# Patient Record
Sex: FEMALE | Race: WHITE | NOT HISPANIC OR LATINO
[De-identification: names, ages, dates, MRNs, and addresses within clinical notes are randomized per-mention and may not be internally consistent; named-entity substitution may affect disease eponyms.]

---

## 2017-11-24 ENCOUNTER — RESULT REVIEW (OUTPATIENT)
Age: 77
End: 2017-11-24

## 2019-06-18 ENCOUNTER — APPOINTMENT (OUTPATIENT)
Dept: ORTHOPEDIC SURGERY | Facility: CLINIC | Age: 79
End: 2019-06-18
Payer: MEDICARE

## 2019-06-18 VITALS
HEIGHT: 58 IN | HEART RATE: 67 BPM | WEIGHT: 160 LBS | SYSTOLIC BLOOD PRESSURE: 149 MMHG | BODY MASS INDEX: 33.58 KG/M2 | DIASTOLIC BLOOD PRESSURE: 89 MMHG

## 2019-06-18 DIAGNOSIS — M25.572 PAIN IN LEFT ANKLE AND JOINTS OF LEFT FOOT: ICD-10-CM

## 2019-06-18 DIAGNOSIS — M25.571 PAIN IN RIGHT ANKLE AND JOINTS OF RIGHT FOOT: ICD-10-CM

## 2019-06-18 DIAGNOSIS — M25.572 PAIN IN RIGHT ANKLE AND JOINTS OF RIGHT FOOT: ICD-10-CM

## 2019-06-18 PROCEDURE — 73610 X-RAY EXAM OF ANKLE: CPT | Mod: RT

## 2019-06-18 PROCEDURE — 99204 OFFICE O/P NEW MOD 45 MIN: CPT

## 2019-06-18 PROCEDURE — 73564 X-RAY EXAM KNEE 4 OR MORE: CPT | Mod: LT

## 2019-06-18 NOTE — CONSULT LETTER
[FreeTextEntry1] : Dear Dr.Evan Pagan,\par \par I had the pleasure of evaluating your patient in the office today. I am sending you a copy of my Orthopaedic consult note for your record. If you have any questions, please do not hesitate to contact my office.\par \par Sincerely,\par \par Arias Otoole MD\par Chief, Adult Joint Reconstruction\par [facility]

## 2019-06-18 NOTE — HISTORY OF PRESENT ILLNESS
[de-identified] : Pt is an 77 y/o female c/o right ankle pain.  She states that she was vacuuming 2 weeks ago and she hit her right ankle.  She developed swelling and her "veins popped out". She also has some swelling of her left ankle.   Her PCP sent her for a Doppler which showed a Bakers Cyst in her right knee. The doppler was negative for DVT She notes intermittent pain in the right knee.  It is sharp and it happens at night.  She has some stiffness. She does have difficulty walking because of stiffness in her legs.  She does not take anything for pain.  She has multiple drug allergies and sensitivities.\par She notes that she had a course of Cipro in March and she developed pain and tightening in her joints.

## 2019-06-18 NOTE — DISCUSSION/SUMMARY
[de-identified] : At this time this patient has severe osteoarthritis in her knees.  She does not want a local injection she says she is managing she will continue to use her cane her ankles are feeling better.  In the future she may benefit from total knee replacements she does not want to take any oral medications until she speaks to Dr. Juan Pablo Pagan because they sometimes irritate her stomach she will talk to Dr. Pagan about the possibility of Celebrex 200 mg once a day.

## 2019-06-18 NOTE — PHYSICAL EXAM
[de-identified] : Constitutional - the patient is of normal build and nutrition.  The patient remains oriented to person, time, and  place.  Mood is normal. Vital signs as recorded are within normal limits.  The patients gait is pain in both knees on the underside radiating down her right legs toward her ankles.  She has had pain in her ankles but they are now improved.. The patient has satisfactory  balance and can to  easily walk on toes and heels.\par \par The patient has no difficulty with respiration. Respiration at rest is a normal rate. The patient is not short of breath and has not become short of breath with short ambulation. There is no audible wheezing. No coughing.\par \par Skin is normal for the patient's age. There are no abnormal masses or lymph nodes which stand out in the lower extremities.\par \par Spine - deep tendon reflexes are symmetric.  Appears by history she may have had backache in the past and some discomfort in her legs could be from her back.\par \par \par UPPER EXTREMITIES \par \par Shoulders ROM  is symmetric  and the motion is satisfactory.  There is no significant shoulder pain or limitation in motion which would make using a cane or a walker difficult. Shoulder stability and  strength are satisfactory.\par \par Circulation appears satisfactory with pedal pulses present.  There is no major edema in the lower legs. No skin tenderness or increased temperature. No major varicosities.\par \par HIP EXAMINATION the abduction and abduction as well as rotation measurements were taken with the hip in flexion.\par \par Motion\par Hip flexion                               *[Right 135   Left 135]\par Hip abduction                         *[Right 70      Left 70]\par Hip adduction                         *[Right 35     Left 35]\par Hip external  Rotation             *[Right 65     Left 65]\par Hip Internal Rotation              *[Right 20      Left 20]\par Hip Extension                         *[Right 0        Left 0]\par \par The hips have good range of motion. There is good strength across the hips. There is no crepitus in either hip. The alignment of the hips are normal.\par \par \par KNEE EXAMINATION\par \par Motion\par Please go from 5 degrees short of full extension to 115 degrees of flexion she has satisfactory medial lateral and anterior posterior stability.  There is no Baker's cyst in either knee she has patellofemoral crepitus bilaterally positive medial Steinmann test bilaterally no pain with palpation of her patella as well as her medial joint lines.\par The Knees have very good motion. There is good medial lateral and anterior posterior stability. There is no crepitus. There is no effusion. There is good strength across the knees.\par \par Ankle and foot examination\par Of the ankle has normal motion.  There is normal ankle stability.  The patient has no major abnormalities of the foot.  Some slight swelling around her ankles but this should just be some fluid retention at this time she is moving her ankles well.\par \par \par \par  [de-identified] : Views of her right and left knees show bilateral medial compartment bone-on-bone arthritis in the standing and Martin views bilateral patellofemoral arthritis femur large peripheral osteophytes on the skyline view.\par \par 3 views of her right and left ankles do show slight degenerative changes small osteophytes of the calcaneus at the insertion Achilles no fracture present generally her ankle joint space is maintained seen on the mortise views.

## 2019-07-08 ENCOUNTER — APPOINTMENT (OUTPATIENT)
Dept: ORTHOPEDIC SURGERY | Facility: CLINIC | Age: 79
End: 2019-07-08

## 2019-08-28 ENCOUNTER — NON-APPOINTMENT (OUTPATIENT)
Age: 79
End: 2019-08-28

## 2019-08-28 ENCOUNTER — APPOINTMENT (OUTPATIENT)
Dept: INTERNAL MEDICINE | Facility: CLINIC | Age: 79
End: 2019-08-28
Payer: MEDICARE

## 2019-08-28 VITALS — HEIGHT: 58 IN | WEIGHT: 168 LBS | BODY MASS INDEX: 35.26 KG/M2

## 2019-08-28 VITALS
SYSTOLIC BLOOD PRESSURE: 146 MMHG | TEMPERATURE: 98.7 F | OXYGEN SATURATION: 93 % | HEART RATE: 90 BPM | DIASTOLIC BLOOD PRESSURE: 79 MMHG

## 2019-08-28 DIAGNOSIS — J45.40 MODERATE PERSISTENT ASTHMA, UNCOMPLICATED: ICD-10-CM

## 2019-08-28 DIAGNOSIS — Z87.19 PERSONAL HISTORY OF OTHER DISEASES OF THE DIGESTIVE SYSTEM: ICD-10-CM

## 2019-08-28 DIAGNOSIS — Z00.00 ENCOUNTER FOR GENERAL ADULT MEDICAL EXAMINATION W/OUT ABNORMAL FINDINGS: ICD-10-CM

## 2019-08-28 PROCEDURE — 93000 ELECTROCARDIOGRAM COMPLETE: CPT

## 2019-08-28 PROCEDURE — G0439: CPT

## 2019-08-28 PROCEDURE — 36415 COLL VENOUS BLD VENIPUNCTURE: CPT

## 2019-08-28 PROCEDURE — 82270 OCCULT BLOOD FECES: CPT

## 2019-08-28 NOTE — HISTORY OF PRESENT ILLNESS
[de-identified] : the patient is an 78-year-old female who new  is to our office. she is to have possible bilateral knee replacements and is concerned about her general health. She has been treated for asthma and diverticulitis. She is only on a puffer which she uses to 3 times per day. She denies cough or whee but get short a breath on e. She has no chest pain palpitation swelling or fainting. she has no GI symptoms other than bloating and had a negative cologard. She has no  symptoms and no neurologic ymptoms. She is she was Cipro for divertitis and followingpains in herms and bloating in the abdomen. She had a CAT s of her abdomenand pelvis which showed diverticulitis. She had a reaction to a flu shot

## 2019-08-28 NOTE — ASSESSMENT
[FreeTextEntry1] : with the exception of  moderate  asthma the patient appears in good health. She is getting consultation for The possibility bilateral knee replacements. I suggested she see  rheumatology for possible gel shots She will a a spirometry to evaluate her respiratory reserve. We are getting  all testing including an EKG.EKG shows left anterior hemiblock and a right intraventricular conduction delay

## 2019-08-28 NOTE — PHYSICAL EXAM
[Obese] : patient was observed to be obese [Normal] : normal gait, coordination grossly intact, no focal deficits [de-identified] : increased expiratory phase [de-identified] : ormal exam [FreeTextEntry1] : uaiac negative [de-identified] : mild crepitus of the right knee but some deformitybilaterally

## 2019-08-28 NOTE — REVIEW OF SYSTEMS
[Negative] : Heme/Lymph [FreeTextEntry7] : u [FreeTextEntry9] : slowed down physically secondary to  arthritis of knees and hips

## 2019-08-29 LAB
25(OH)D3 SERPL-MCNC: 12.9 NG/ML
ALBUMIN SERPL ELPH-MCNC: 4.3 G/DL
ALP BLD-CCNC: 89 U/L
ALT SERPL-CCNC: 12 U/L
ANION GAP SERPL CALC-SCNC: 11 MMOL/L
APPEARANCE: CLEAR
AST SERPL-CCNC: 15 U/L
BACTERIA: ABNORMAL
BASOPHILS # BLD AUTO: 0.04 K/UL
BASOPHILS NFR BLD AUTO: 0.6 %
BILIRUB SERPL-MCNC: 0.5 MG/DL
BILIRUBIN URINE: NEGATIVE
BLOOD URINE: NEGATIVE
BUN SERPL-MCNC: 18 MG/DL
CALCIUM SERPL-MCNC: 9.9 MG/DL
CHLORIDE SERPL-SCNC: 104 MMOL/L
CHOLEST SERPL-MCNC: 217 MG/DL
CHOLEST/HDLC SERPL: 3.1 RATIO
CO2 SERPL-SCNC: 25 MMOL/L
COLOR: NORMAL
CREAT SERPL-MCNC: 0.58 MG/DL
EOSINOPHIL # BLD AUTO: 0.06 K/UL
EOSINOPHIL NFR BLD AUTO: 0.9 %
GLUCOSE QUALITATIVE U: NEGATIVE
GLUCOSE SERPL-MCNC: 100 MG/DL
HCT VFR BLD CALC: 41.7 %
HDLC SERPL-MCNC: 69 MG/DL
HGB BLD-MCNC: 13.5 G/DL
HYALINE CASTS: 0 /LPF
IMM GRANULOCYTES NFR BLD AUTO: 0.6 %
KETONES URINE: NEGATIVE
LDLC SERPL CALC-MCNC: 122 MG/DL
LEUKOCYTE ESTERASE URINE: ABNORMAL
LYMPHOCYTES # BLD AUTO: 1.85 K/UL
LYMPHOCYTES NFR BLD AUTO: 26.9 %
MAN DIFF?: NORMAL
MCHC RBC-ENTMCNC: 28.4 PG
MCHC RBC-ENTMCNC: 32.4 GM/DL
MCV RBC AUTO: 87.8 FL
MICROSCOPIC-UA: NORMAL
MONOCYTES # BLD AUTO: 0.73 K/UL
MONOCYTES NFR BLD AUTO: 10.6 %
NEUTROPHILS # BLD AUTO: 4.16 K/UL
NEUTROPHILS NFR BLD AUTO: 60.4 %
NITRITE URINE: POSITIVE
PH URINE: 5.5
PLATELET # BLD AUTO: 288 K/UL
POTASSIUM SERPL-SCNC: 4.6 MMOL/L
PROT SERPL-MCNC: 6.9 G/DL
PROTEIN URINE: NEGATIVE
RBC # BLD: 4.75 M/UL
RBC # FLD: 13.9 %
RED BLOOD CELLS URINE: 1 /HPF
SODIUM SERPL-SCNC: 140 MMOL/L
SPECIFIC GRAVITY URINE: 1.02
SQUAMOUS EPITHELIAL CELLS: 5 /HPF
T4 SERPL-MCNC: 6.1 UG/DL
TRIGL SERPL-MCNC: 128 MG/DL
TSH SERPL-ACNC: 1.12 UIU/ML
UROBILINOGEN URINE: NORMAL
WBC # FLD AUTO: 6.88 K/UL
WHITE BLOOD CELLS URINE: 10 /HPF

## 2019-09-12 ENCOUNTER — APPOINTMENT (OUTPATIENT)
Dept: ORTHOPEDIC SURGERY | Facility: CLINIC | Age: 79
End: 2019-09-12

## 2019-10-08 ENCOUNTER — APPOINTMENT (OUTPATIENT)
Dept: ORTHOPEDIC SURGERY | Facility: CLINIC | Age: 79
End: 2019-10-08

## 2021-07-19 ENCOUNTER — APPOINTMENT (OUTPATIENT)
Dept: SURGERY | Facility: CLINIC | Age: 81
End: 2021-07-19

## 2022-03-15 ENCOUNTER — APPOINTMENT (OUTPATIENT)
Dept: UROLOGY | Facility: CLINIC | Age: 82
End: 2022-03-15

## 2022-05-09 ENCOUNTER — NON-APPOINTMENT (OUTPATIENT)
Age: 82
End: 2022-05-09

## 2022-06-30 ENCOUNTER — NON-APPOINTMENT (OUTPATIENT)
Age: 82
End: 2022-06-30

## 2022-07-12 ENCOUNTER — APPOINTMENT (OUTPATIENT)
Dept: UROLOGY | Facility: CLINIC | Age: 82
End: 2022-07-12

## 2022-08-30 ENCOUNTER — APPOINTMENT (OUTPATIENT)
Dept: UROLOGY | Facility: CLINIC | Age: 82
End: 2022-08-30

## 2022-10-09 NOTE — HISTORY OF PRESENT ILLNESS
[FreeTextEntry1] : ONOFRE NGO  is a 82 year old P*** presenting for evaluation of urinary frequency and incontinence.\par \par Daytime voids: ***\par Nighttime voids: ***\par \par Fluid intake:\par Water: ***  /day\par Coffee: *** cups/day\par Tea: ***  cups/day\par Soda: *** /day\par Juice: *** /day\par Alcohol: ***\par \par PMH: ***\par PSH: ***\par Allergies: ***\par Meds: ***\par OBx: ***\par Social Hx:\par Family Hx:

## 2022-10-10 ENCOUNTER — APPOINTMENT (OUTPATIENT)
Dept: UROGYNECOLOGY | Facility: CLINIC | Age: 82
End: 2022-10-10

## 2022-10-15 ENCOUNTER — NON-APPOINTMENT (OUTPATIENT)
Age: 82
End: 2022-10-15

## 2022-10-27 ENCOUNTER — APPOINTMENT (OUTPATIENT)
Dept: UROGYNECOLOGY | Facility: CLINIC | Age: 82
End: 2022-10-27

## 2022-11-16 ENCOUNTER — APPOINTMENT (OUTPATIENT)
Dept: THORACIC SURGERY | Facility: CLINIC | Age: 82
End: 2022-11-16

## 2022-11-18 ENCOUNTER — TRANSCRIPTION ENCOUNTER (OUTPATIENT)
Age: 82
End: 2022-11-18

## 2022-11-22 ENCOUNTER — APPOINTMENT (OUTPATIENT)
Dept: INTERNAL MEDICINE | Facility: CLINIC | Age: 82
End: 2022-11-22

## 2022-12-07 ENCOUNTER — APPOINTMENT (OUTPATIENT)
Dept: INTERNAL MEDICINE | Facility: CLINIC | Age: 82
End: 2022-12-07

## 2023-04-24 ENCOUNTER — APPOINTMENT (OUTPATIENT)
Dept: SURGERY | Facility: CLINIC | Age: 83
End: 2023-04-24
Payer: MEDICARE

## 2023-04-24 VITALS
TEMPERATURE: 98 F | BODY MASS INDEX: 32.12 KG/M2 | OXYGEN SATURATION: 99 % | WEIGHT: 153 LBS | HEART RATE: 79 BPM | RESPIRATION RATE: 18 BRPM | SYSTOLIC BLOOD PRESSURE: 156 MMHG | DIASTOLIC BLOOD PRESSURE: 91 MMHG | HEIGHT: 58 IN

## 2023-04-24 DIAGNOSIS — K44.9 DIAPHRAGMATIC HERNIA W/OUT OBSTRUCTION OR GANGRENE: ICD-10-CM

## 2023-04-24 PROCEDURE — 99215 OFFICE O/P EST HI 40 MIN: CPT

## 2023-04-24 RX ORDER — CELECOXIB 200 MG/1
200 CAPSULE ORAL DAILY
Qty: 60 | Refills: 0 | Status: DISCONTINUED | COMMUNITY
Start: 2019-06-18 | End: 2023-04-24

## 2023-04-24 RX ORDER — CELECOXIB 200 MG/1
200 CAPSULE ORAL DAILY
Qty: 60 | Refills: 2 | Status: DISCONTINUED | OUTPATIENT
Start: 2019-06-18 | End: 2023-04-24

## 2023-04-24 NOTE — HISTORY OF PRESENT ILLNESS
[de-identified] : Swati is an 82 year old female here for a consultation fro Hiatal Hernia. \par \par EGD from 06/05/2012- Hiatus Hernia small, Acute gastritis. without hemorrhage.  Polyp of stomach. Hypertropic gastritis.\par \par Patient states that she scheduled an appointment today because it's been over 10 years since her last endoscopy, and she continues to have symptoms of indigestion abnout 2-3 times per week, for which she takes Pepcid. Additionally, she has been using her inhaler, and is wondering if her pulmonary symptoms may be related to her hiatal hernia. Patient has an appointment scheduled with her gastroenterologist. Of note, she underwent a CT of the abdomen and pelvis in [place] in 2019, demonstrating mild diverticular disease, per the patient. She denies antibiotics at the time. Patient says that the report mentioned a moderate hiatal hernia; however she does not have the report or discs with her today. Patient otherwise denies any symptoms of dysphagia, nausea, vomiting, abd pain. No fevers, chills, sweats, diarrhea, constipation. She had an episode of lower abd pain and mild hematuria recently, which resolved after 1 hr; she has an appointment with her urologist.

## 2023-04-24 NOTE — ASSESSMENT
[FreeTextEntry1] : 82-year-old female with history of small hiatal hernia diagnosed in 2012 on upper endoscopy, presenting with continued pulmonary complaints and more recent cross-sectional imaging (2019) at outside hospital demonstrating moderate hiatal hernia.  She takes Pepcid a few times a week has pulmonary complaints unclear of microaspiration.  We will obtain a CAT scan to better characterize her hiatal hernia.  And may need Bravo and manometry if we consider proceeding with a surgical repair.

## 2023-04-24 NOTE — PLAN
[FreeTextEntry1] : - F/U GI evaluation: upper and lower endoscopy\par - Will obtain CTCAP with PO/IV contrast\par - Patient to see her urologist, gynecologist, and primary care physician as scheduled\par - Follow up in 1 month

## 2023-04-24 NOTE — PHYSICAL EXAM
[Normal Breath Sounds] : Normal breath sounds [Normal Heart Sounds] : normal heart sounds [No Rash or Lesion] : No rash or lesion [Alert] : alert [Oriented to Person] : oriented to person [Oriented to Place] : oriented to place [Oriented to Time] : oriented to time [Calm] : calm [JVD] : no jugular venous distention  [de-identified] : NAD [de-identified] : Neuro- Cranial nerve grossly intact. Normal gait.  [de-identified] : b/l breath sounds [de-identified] : +s1/s2 [de-identified] : Soft, obese, ND, NT, well-healed laparoscopic surgical incisions [de-identified] : Uses walker/cane; HERRON x4, restricted range of motion of left upper extremity due to shoulder pain [de-identified] : CN II-XII grossly intact

## 2023-04-29 ENCOUNTER — APPOINTMENT (OUTPATIENT)
Dept: CT IMAGING | Facility: IMAGING CENTER | Age: 83
End: 2023-04-29

## 2023-07-06 ENCOUNTER — APPOINTMENT (OUTPATIENT)
Dept: UROGYNECOLOGY | Facility: CLINIC | Age: 83
End: 2023-07-06

## 2023-07-27 ENCOUNTER — APPOINTMENT (OUTPATIENT)
Dept: ORTHOPEDIC SURGERY | Facility: CLINIC | Age: 83
End: 2023-07-27

## 2023-08-09 ENCOUNTER — APPOINTMENT (OUTPATIENT)
Dept: RHEUMATOLOGY | Facility: CLINIC | Age: 83
End: 2023-08-09

## 2023-08-23 ENCOUNTER — APPOINTMENT (OUTPATIENT)
Dept: ORTHOPEDIC SURGERY | Facility: CLINIC | Age: 83
End: 2023-08-23
Payer: MEDICARE

## 2023-08-23 VITALS
HEIGHT: 58 IN | WEIGHT: 150 LBS | BODY MASS INDEX: 31.49 KG/M2 | HEART RATE: 73 BPM | SYSTOLIC BLOOD PRESSURE: 159 MMHG | DIASTOLIC BLOOD PRESSURE: 79 MMHG | TEMPERATURE: 97.3 F

## 2023-08-23 DIAGNOSIS — M17.11 UNILATERAL PRIMARY OSTEOARTHRITIS, RIGHT KNEE: ICD-10-CM

## 2023-08-23 DIAGNOSIS — M17.12 UNILATERAL PRIMARY OSTEOARTHRITIS, LEFT KNEE: ICD-10-CM

## 2023-08-23 PROCEDURE — 99204 OFFICE O/P NEW MOD 45 MIN: CPT | Mod: 25

## 2023-08-23 PROCEDURE — 73564 X-RAY EXAM KNEE 4 OR MORE: CPT | Mod: 50

## 2023-08-23 PROCEDURE — 20610 DRAIN/INJ JOINT/BURSA W/O US: CPT | Mod: 50

## 2024-03-19 ENCOUNTER — APPOINTMENT (OUTPATIENT)
Dept: ORTHOPEDIC SURGERY | Facility: CLINIC | Age: 84
End: 2024-03-19

## 2024-05-24 ENCOUNTER — APPOINTMENT (OUTPATIENT)
Dept: ORTHOPEDIC SURGERY | Facility: CLINIC | Age: 84
End: 2024-05-24

## 2025-04-14 ENCOUNTER — EMERGENCY (EMERGENCY)
Facility: HOSPITAL | Age: 85
LOS: 1 days | End: 2025-04-14
Attending: STUDENT IN AN ORGANIZED HEALTH CARE EDUCATION/TRAINING PROGRAM
Payer: MEDICARE

## 2025-04-14 VITALS
DIASTOLIC BLOOD PRESSURE: 83 MMHG | HEIGHT: 61 IN | TEMPERATURE: 99 F | OXYGEN SATURATION: 97 % | RESPIRATION RATE: 20 BRPM | WEIGHT: 139.99 LBS | HEART RATE: 91 BPM | SYSTOLIC BLOOD PRESSURE: 153 MMHG

## 2025-04-14 LAB
ALBUMIN SERPL ELPH-MCNC: 4 G/DL — SIGNIFICANT CHANGE UP (ref 3.3–5)
ALP SERPL-CCNC: 72 U/L — SIGNIFICANT CHANGE UP (ref 40–120)
ALT FLD-CCNC: 9 U/L — LOW (ref 10–45)
ANION GAP SERPL CALC-SCNC: 11 MMOL/L — SIGNIFICANT CHANGE UP (ref 5–17)
ANION GAP SERPL CALC-SCNC: 12 MMOL/L — SIGNIFICANT CHANGE UP (ref 5–17)
APPEARANCE UR: CLEAR — SIGNIFICANT CHANGE UP
AST SERPL-CCNC: 14 U/L — SIGNIFICANT CHANGE UP (ref 10–40)
BACTERIA # UR AUTO: NEGATIVE /HPF — SIGNIFICANT CHANGE UP
BASOPHILS # BLD AUTO: 0.07 K/UL — SIGNIFICANT CHANGE UP (ref 0–0.2)
BASOPHILS NFR BLD AUTO: 0.6 % — SIGNIFICANT CHANGE UP (ref 0–2)
BILIRUB SERPL-MCNC: 0.4 MG/DL — SIGNIFICANT CHANGE UP (ref 0.2–1.2)
BILIRUB UR-MCNC: NEGATIVE — SIGNIFICANT CHANGE UP
BUN SERPL-MCNC: 23 MG/DL — SIGNIFICANT CHANGE UP (ref 7–23)
BUN SERPL-MCNC: 25 MG/DL — HIGH (ref 7–23)
CALCIUM SERPL-MCNC: 10 MG/DL — SIGNIFICANT CHANGE UP (ref 8.4–10.5)
CALCIUM SERPL-MCNC: 10.3 MG/DL — SIGNIFICANT CHANGE UP (ref 8.4–10.5)
CAST: 0 /LPF — SIGNIFICANT CHANGE UP (ref 0–4)
CHLORIDE SERPL-SCNC: 103 MMOL/L — SIGNIFICANT CHANGE UP (ref 96–108)
CHLORIDE SERPL-SCNC: 105 MMOL/L — SIGNIFICANT CHANGE UP (ref 96–108)
CO2 SERPL-SCNC: 22 MMOL/L — SIGNIFICANT CHANGE UP (ref 22–31)
CO2 SERPL-SCNC: 23 MMOL/L — SIGNIFICANT CHANGE UP (ref 22–31)
COLOR SPEC: YELLOW — SIGNIFICANT CHANGE UP
CREAT SERPL-MCNC: 0.66 MG/DL — SIGNIFICANT CHANGE UP (ref 0.5–1.3)
CREAT SERPL-MCNC: 0.69 MG/DL — SIGNIFICANT CHANGE UP (ref 0.5–1.3)
DIFF PNL FLD: ABNORMAL
EGFR: 84 ML/MIN/1.73M2 — SIGNIFICANT CHANGE UP
EGFR: 84 ML/MIN/1.73M2 — SIGNIFICANT CHANGE UP
EGFR: 85 ML/MIN/1.73M2 — SIGNIFICANT CHANGE UP
EGFR: 85 ML/MIN/1.73M2 — SIGNIFICANT CHANGE UP
EOSINOPHIL # BLD AUTO: 0.12 K/UL — SIGNIFICANT CHANGE UP (ref 0–0.5)
EOSINOPHIL NFR BLD AUTO: 1.1 % — SIGNIFICANT CHANGE UP (ref 0–6)
GLUCOSE SERPL-MCNC: 105 MG/DL — HIGH (ref 70–99)
GLUCOSE SERPL-MCNC: 109 MG/DL — HIGH (ref 70–99)
GLUCOSE UR QL: NEGATIVE MG/DL — SIGNIFICANT CHANGE UP
HCT VFR BLD CALC: 40.5 % — SIGNIFICANT CHANGE UP (ref 34.5–45)
HGB BLD-MCNC: 13.3 G/DL — SIGNIFICANT CHANGE UP (ref 11.5–15.5)
IMM GRANULOCYTES NFR BLD AUTO: 0.7 % — SIGNIFICANT CHANGE UP (ref 0–0.9)
KETONES UR-MCNC: NEGATIVE MG/DL — SIGNIFICANT CHANGE UP
LEUKOCYTE ESTERASE UR-ACNC: NEGATIVE — SIGNIFICANT CHANGE UP
LYMPHOCYTES # BLD AUTO: 1.83 K/UL — SIGNIFICANT CHANGE UP (ref 1–3.3)
LYMPHOCYTES # BLD AUTO: 16.6 % — SIGNIFICANT CHANGE UP (ref 13–44)
MCHC RBC-ENTMCNC: 27.8 PG — SIGNIFICANT CHANGE UP (ref 27–34)
MCHC RBC-ENTMCNC: 32.8 G/DL — SIGNIFICANT CHANGE UP (ref 32–36)
MCV RBC AUTO: 84.7 FL — SIGNIFICANT CHANGE UP (ref 80–100)
MONOCYTES # BLD AUTO: 1.15 K/UL — HIGH (ref 0–0.9)
MONOCYTES NFR BLD AUTO: 10.4 % — SIGNIFICANT CHANGE UP (ref 2–14)
NEUTROPHILS # BLD AUTO: 7.76 K/UL — HIGH (ref 1.8–7.4)
NEUTROPHILS NFR BLD AUTO: 70.6 % — SIGNIFICANT CHANGE UP (ref 43–77)
NITRITE UR-MCNC: NEGATIVE — SIGNIFICANT CHANGE UP
NRBC BLD AUTO-RTO: 0 /100 WBCS — SIGNIFICANT CHANGE UP (ref 0–0)
PH UR: 6 — SIGNIFICANT CHANGE UP (ref 5–8)
PLATELET # BLD AUTO: 349 K/UL — SIGNIFICANT CHANGE UP (ref 150–400)
POTASSIUM SERPL-MCNC: 4.4 MMOL/L — SIGNIFICANT CHANGE UP (ref 3.5–5.3)
POTASSIUM SERPL-MCNC: 4.5 MMOL/L — SIGNIFICANT CHANGE UP (ref 3.5–5.3)
POTASSIUM SERPL-SCNC: 4.4 MMOL/L — SIGNIFICANT CHANGE UP (ref 3.5–5.3)
POTASSIUM SERPL-SCNC: 4.5 MMOL/L — SIGNIFICANT CHANGE UP (ref 3.5–5.3)
PROT SERPL-MCNC: 6.8 G/DL — SIGNIFICANT CHANGE UP (ref 6–8.3)
PROT UR-MCNC: NEGATIVE MG/DL — SIGNIFICANT CHANGE UP
RBC # BLD: 4.78 M/UL — SIGNIFICANT CHANGE UP (ref 3.8–5.2)
RBC # FLD: 14.3 % — SIGNIFICANT CHANGE UP (ref 10.3–14.5)
RBC CASTS # UR COMP ASSIST: 1 /HPF — SIGNIFICANT CHANGE UP (ref 0–4)
SODIUM SERPL-SCNC: 138 MMOL/L — SIGNIFICANT CHANGE UP (ref 135–145)
SODIUM SERPL-SCNC: 138 MMOL/L — SIGNIFICANT CHANGE UP (ref 135–145)
SP GR SPEC: 1.02 — SIGNIFICANT CHANGE UP (ref 1–1.03)
SQUAMOUS # UR AUTO: 4 /HPF — SIGNIFICANT CHANGE UP (ref 0–5)
UROBILINOGEN FLD QL: 0.2 MG/DL — SIGNIFICANT CHANGE UP (ref 0.2–1)
WBC # BLD: 11.01 K/UL — HIGH (ref 3.8–10.5)
WBC # FLD AUTO: 11.01 K/UL — HIGH (ref 3.8–10.5)
WBC UR QL: 1 /HPF — SIGNIFICANT CHANGE UP (ref 0–5)

## 2025-04-14 PROCEDURE — 93010 ELECTROCARDIOGRAM REPORT: CPT

## 2025-04-14 PROCEDURE — 99285 EMERGENCY DEPT VISIT HI MDM: CPT | Mod: GC

## 2025-04-14 PROCEDURE — 70450 CT HEAD/BRAIN W/O DYE: CPT | Mod: 26

## 2025-04-14 NOTE — ED PROVIDER NOTE - CARE PLAN
Principal Discharge DX:	At risk for self-harm   1 Principal Discharge DX:	Adjustment disorder with disturbance of emotion

## 2025-04-14 NOTE — ED PROVIDER NOTE - ATTENDING CONTRIBUTION TO CARE
83 yo F with PMHx of hypertension, dementia, OA presents from facility for agitation. Hx difficult to obtain from patient secondary to dementia. She reports she was upset earlier because she couldn't see her  but states she  needed to tell him she saw their son stealing their car from her house but also endorses she lives at [facility]     PE: well appearing, nontoxic, no respiratory distress.  Neuro nonfocal.  Skin intact. Psych normal mood.    MDM: Will assess for medical causes of her agitation including PNA and UTI   Will obtain collateral from family/facility

## 2025-04-14 NOTE — ED ADULT NURSE NOTE - SUICIDE SCREENING QUESTION 3
Medication: allopurinal   Last office visit date: 9/11/2023  Next appointment scheduled?: No;  follow up due 9/11/2024    Number of refills given: 1    No

## 2025-04-14 NOTE — ED PROVIDER NOTE - NSFOLLOWUPINSTRUCTIONS_ED_ALL_ED_FT
You were seen and evaluated in the Emergency Department for your agitation and threats to hurt yourself. You were evaluated clinically and with laboratory studies.    At this time your clinical evaluation and history do not demonstrate any acute, life-threatening medical conditions warranting emergent treatment. You were cleared by our psychiatrists for any psychiatric conditions causing your condition. You follow up with one of our Psychiatric consultants (or your own) for if you would like further evaluation of your symptoms by calling the following number to make an appointment:    [facility]  [address] [place] [id number] [phone]  WALK IN TIMES: M-F 9 AM - 7PM    Should you develop new or worsening chest pain, shortness of breath, fevers, chills, nausea, vomiting, diarrhea, or constipation; suicidal or homicidal thoughts - please return to the ED for immediate evaluation.     We also strongly encourage you make an appointment with your Primary Care Physician for a comprehensive evaluation of your health.

## 2025-04-14 NOTE — ED PROVIDER NOTE - PHYSICAL EXAMINATION
Leonie Murray MD (PGY-1)  Physical Exam:    Gen: NAD, AOx3  Head: NCAT  HEENT: EOMI, PEERLA  Lung: CTAB, no respiratory distress, no wheezes/rhonchi/rales B/L  CV: RRR, no murmurs, rubs or gallops  Abd: soft, NT, ND, no guarding, no rigidity, no rebound tenderness, no CVA tenderness   MSK: no visible deformities, ROM normal in UE/LE  Neuro: No focal sensory or motor deficits. Sensation intact to light touch all extremities.  Skin: Warm, well perfused, no rash, no leg swelling  Psych: normal affect, calm

## 2025-04-14 NOTE — ED PROVIDER NOTE - ATTENDING WITH...
10/29/2024      RE: Laura Jackson  [address]  [place] [address]       Dear Colleague,    Thank you for the opportunity to participate in the care of your patient, Laura Jackson, at the [facility] [place] [facility]. Please see a copy of my visit note below.    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  She has a past history of hypertension and sinus tachycardia.  The latter has been well treated with ivabradine and she would like to continue it.  In part her tachycardia may be related to chronic arthritic issues including Edilma-Danlos in the family.  In any case, her symptoms today seem to be unrelated to rapid heart beating apart from some increased irregularity that she has noticed.    At today's visit Laura's principal problems appear to be arthritis in both knees and and particularly currently the left knee.  She may be considering knee replacements in the not-too-distant future.  She also has concerns regarding her ability to continue working as an ICU nurse with the current arthritic issues.    Laura is concerned about weight gain and in addition notes that she has had some discomfort in her upper chest and jaw unassociated with exertion.  Nonetheless she is worried about cardiac status given her desire to increase physical activity for weight control.  We will obtain a Lay scan for that purpose.    In addition in terms of the increased irregularity of her heart we will obtain a 1 week Zio patch and reassess whether any change in medications as needed.    Patient indicates that apparently she had some abnormal liver studies done by her family physician.  I do not have access to those results today.  She does show some increased inflammatory markers but these may be related to ongoing arthritic issues that are particularly bothersome  recently.      PAST MEDICAL HISTORY:  Past Medical History:   Diagnosis Date     Benign essential hypertension 07/31/2018     Family history of breast cancer 02/19/2014     Family history of breast cancer      Hypothyroidism 12/01/2022     Mechanical low back pain 06/03/2019     SVT (supraventricular tachycardia):  history of, no recurrence since 2008 10/11/2013    no recurrence since 2008      Uncomplicated asthma        CURRENT MEDICATIONS:  Current Outpatient Medications   Medication Sig Dispense Refill     Cholecalciferol (VITAMIN D) 2000 UNITS CAPS Take 1 capsule by mouth daily       fluticasone-salmeterol (ADVAIR DISKUS) 100-50 MCG/ACT inhaler INHALE 1 PUFF BY MOUTH INTO THE LUNGS EVERY 12 HOURS 1 each 5     ivabradine (CORLANOR) 5 MG tablet Take 1 tablet (5 mg) by mouth 2 times daily (with meals) Keep cardiology appointment for further refills 180 tablet 0     levalbuterol (XOPENEX HFA) 45 MCG/ACT inhaler Inhale 1-2 puffs into the lungs every 4 hours as needed for shortness of breath No further refills until appointment 15 g 11     magnesium 250 MG tablet Take 1 tablet by mouth daily.       metroNIDAZOLE (METROGEL) 0.75 % external gel Apply to face BID 45 g 3     Pyridoxine HCl (B-6) 100 MG TABS        vitamin B-12 (CYANOCOBALAMIN) 100 MCG tablet          PAST SURGICAL HISTORY:  Past Surgical History:   Procedure Laterality Date     ABDOMEN SURGERY  06/2017    myectomy     APPENDECTOMY       ARTHROSCOPY ANKLE, OPEN REPAIR LIGAMENT, COMBINED Right 02/02/2023    Procedure: ankle arthroscopy, modified Brostrom, peroneal tendon repair, exostectomy of medial malleolus;  Surgeon: Saida Michael DPM;  Location: [facility] OR     COLONOSCOPY N/A 08/20/2018    Procedure: COMBINED COLONOSCOPY, SINGLE OR MULTIPLE BIOPSY/POLYPECTOMY BY BIOPSY;;  Surgeon: Osman Hahn MD;  Location: [facility] OR     COLONOSCOPY WITH CO2 INSUFFLATION N/A 08/20/2018    Procedure: COLONOSCOPY WITH CO2 INSUFFLATION;  COLON-SCREENING / VICTORIANO  ;  Surgeon: Osman Hahn MD;  Location: [facility] OR     DAVINCI HYSTERECTOMY TOTAL, SALPINGECTOMY BILATERAL N/A 08/04/2017    Procedure: DAVINCI XI HYSTERECTOMY TOTAL, SALPINGECTOMY BILATERAL;  DAVINCI TOTAL HYSTERECTOMY; BILATERAL SALPINGECTOMY. BILATERAL URETERAL LYSIS. UTEROSACRAL-COLPOPEXY. EXCISION OF ENDOMETRIOSIS;  Surgeon: George Loyola MD;  Location: [facility] OR     DAVINCI LYSIS OF ADHESIONS Bilateral 08/04/2017    Procedure: DAVINCI LYSIS OF ADHESIONS;  BILATERAL URETERAL LYSIS;  Surgeon: George Loyola MD;  Location: [facility] OR     DAVINCI SACROCOLPOPEXY, CYSTOSCOPY, COMBINED N/A 08/04/2017    Procedure: COMBINED DAVINCI SACROCOLPOPEXY, CYSTOSCOPY;  UTEROSACRAL COLPOPEXY;  Surgeon: George Loyola MD;  Location: [facility] OR     DAVINCI XI ASSISTED ABLATION / EXCISION OF ENDOMETRIOSIS  08/04/2017    Procedure: DAVINCI XI ASSISTED ABLATION / EXCISION OF ENDOMETRIOSIS;;  Surgeon: George Loyola MD;  Location: [facility] OR     DILATION AND CURETTAGE N/A 06/20/2017    Procedure: DILATION AND CURETTAGE;  Uterine Curettings and Fibroid Removal, Cook catheter placement; (No hysterectomy done at this time);  Surgeon: Ernestina Saunders MD;  Location: [facility] OR     HYSTERECTOMY, PAP NO LONGER INDICATED       ORTHOPEDIC SURGERY  06/1986    Heel spur , toe surgery     RELEASE CARPAL TUNNEL Right 10/20/2020    Procedure: RIGHT RELEASE, CARPAL TUNNEL;  Surgeon: Rickey Rea MD;  Location: [facility] [place]     RELEASE CARPAL TUNNEL Left 11/06/2020    Procedure: LEFT RELEASE, CARPAL TUNNEL;  Surgeon: Rickey Rea MD;  Location: [facility] OR     SOFT TISSUE SURGERY  2/2/2023     TONSILLECTOMY         ALLERGIES:     Allergies   Allergen Reactions     Penicillins Swelling     Swelling throat; age 4     Adhesive Tape Hives     Penicillin G      Tetracycline GI Disturbance     Other reaction(s): Abdominal Pain  Vomiting; nauseous  Other reaction(s): Abdominal Pain  Vomiting; nauseous       FAMILY HISTORY:  Family History    Problem Relation Age of Onset     Diabetes Mother      Hypertension Mother      Hyperlipidemia Mother      Arrhythmia Mother      Cardiovascular Father         CHF and COPD     Asthma Father      Breast Cancer Maternal Grandfather      Colon Cancer Maternal Grandfather         His mother  of cancer too     Breast Cancer Paternal Grandmother      Breast Cancer Paternal Aunt      JAGDISHAJUAN DANIEL. Paternal Grandfather      Breast Cancer Paternal Grandfather      Breast Cancer Cousin      Asthma Son      Diabetes Maternal Grandmother      Hypertension Maternal Grandmother      Breast Cancer Cousin      Breast Cancer Cousin      Breast Cancer Cousin         Maternal     Breast Cancer Other      Breast Cancer Other         Maternal aunt     SOCIAL HISTORY:  Social History     Tobacco Use     Smoking status: Never     Smokeless tobacco: Never   Vaping Use     Vaping status: Never Used   Substance Use Topics     Alcohol use: Yes     Comment: Rare- 1 every 2-3month     Drug use: Never       ROS:   Constitutional: No fever, chills, or sweats. Weight stable.   ENT: No visual disturbance, ear ache, epistaxis, sore throat.   Cardiovascular: As per HPI.   Respiratory: No cough, hemoptysis.    GI: No nausea, vomiting, .   : No hematuria.   Integument: Negative.   Psychiatric: Negative.   Hematologic:  , no easy bleeding.  Neuro: Negative.   Endocrinology: No significant heat or cold intolerance   Musculoskeletal: See HPI.    Exam:  /88 (BP Location: Right arm, Patient Position: Chair, Cuff Size: Adult Large)   Pulse 65   Wt 88.3 kg (194 lb 11.2 oz)   LMP 2017 (Exact Date)   SpO2 99%   BMI 33.42 kg/m    GENERAL APPEARANCE: healthy, alert and no distress  HEENT: no icterus, no xanthelasmas, normal pupil size and reaction, normal palate, mucosa moist, no central cyanosis  NECK: JVP not elevated  RESPIRATORY: lungs clear to auscultation - no rales, rhonchi or wheezes, no use of accessory muscles, no retractions,  respirations are unlabored, normal respiratory rate  CARDIOVASCULAR: regular rhythm, normal S1 with physiologic split S2, no S3 or S4 and no murmur, click or rub, precordium quiet with normal PMI.  ABDOMEN: soft, non tender, kyung, no bruits  NEURO: alert and oriented to person/place/time, normal speech, gait and affect  SKIN: no ecchymoses, no rashes    Labs:  CBC RESULTS:   Lab Results   Component Value Date    WBC 8.1 05/20/2024    WBC 6.2 03/31/2021    RBC 4.93 05/20/2024    RBC 4.75 03/31/2021    HGB 14.1 05/20/2024    HGB 14.1 03/31/2021    HCT 42.9 05/20/2024    HCT 43.2 03/31/2021    MCV 87 05/20/2024    MCV 91 03/31/2021    MCH 28.6 05/20/2024    MCH 29.7 03/31/2021    MCHC 32.9 05/20/2024    MCHC 32.6 03/31/2021    RDW 13.0 05/20/2024    RDW 14.0 03/31/2021     05/20/2024     03/31/2021       BMP RESULTS:  Lab Results   Component Value Date     05/11/2023     03/31/2021    POTASSIUM 4.5 05/11/2023    POTASSIUM 4.3 01/26/2023    POTASSIUM 4.2 03/31/2021    CHLORIDE 104 05/11/2023    CHLORIDE 105 01/26/2023    CHLORIDE 104 03/31/2021    CO2 24 05/11/2023    CO2 28 01/26/2023    CO2 31 03/31/2021    ANIONGAP 13 05/11/2023    ANIONGAP 6 01/26/2023    ANIONGAP 3 03/31/2021     (H) 05/11/2023     (H) 01/26/2023    GLC 87 03/31/2021    BUN 13.7 05/11/2023    BUN 16 01/26/2023    BUN 15 03/31/2021    CR 0.92 05/11/2023    CR 0.99 03/31/2021    GFRESTIMATED 73 05/11/2023    GFRESTIMATED 65 03/31/2021    GFRESTBLACK 76 03/31/2021    AZAEL 9.9 05/11/2023    AZAEL 9.4 03/31/2021        INR RESULTS:  Lab Results   Component Value Date    INR 0.95 05/20/2024    INR 1.12 06/20/2017    INR 1.12 06/20/2017       Procedures:  PULMONARY FUNCTION TESTS:        No data to display                  ECHOCARDIOGRAM:   No results found for this or any previous visit (from the past 8760 hours).      Assessment and Plan:   1.  Sinus tachycardia likely secondary to other comorbidities but under  reasonable control with ivabradine  2.  Multiple arthritic complaints--particularly in the pain  3.  New complaints of upper chest and jaw pain--Lay scan planned  4.  New complaints of some increased irregularity of heart rhythm-- Zio patch plan    Plan  1. 1 week Zio patch--irregular heart rhythm  2.  Lay scan--chest pain evaluation  3. Repeat Liver fcn tests  4 chart check when results are available  5.  follow-up 1 year    Total elapsed time for chart review, clinic visit and documentation 30 minutes    I very much appreciated the opportunity to see and assess Laura Jackson in the clinic today. Please do not hesitate to contact my office if you have any questions or concerns.      Phil Mitchell MD  Cardiac Arrhythmia Service  [facility]  [phone]       4.  Follow-up 1 year      CC  PHIL MITCHELL      Please do not hesitate to contact me if you have any questions/concerns.     Sincerely,     Phil Mitchell MD   Resident

## 2025-04-14 NOTE — ED ADULT NURSE REASSESSMENT NOTE - NS ED NURSE REASSESS COMMENT FT1
Report received from NIRMAL High and NIRMAL Bates. Pt is observed sitting up in stretcher conversing with RN at this time. Pt breathing spontaneous and unlabored. Pt updated on plan of care and awaiting Ct scan but pt refusing urine, ct scan at this time. Safety and comfort measures maintained. Call bell within reach.

## 2025-04-14 NOTE — ED ADULT NURSE REASSESSMENT NOTE - NS ED NURSE REASSESS COMMENT FT1
Pt observed sitting up in stretcher conversing with RN without difficulty. Breathing spontaneous and unlabored. Pt updated on plan of care awaiting ct scan result and blood results. No acute distress noted. Call bell within reach. Pt observed sitting up in stretcher conversing with RN without difficulty. Breathing spontaneous and unlabored. Pt updated on plan of care awaiting ct scan result and blood results. No acute distress noted. Call bell within reach. Pt educated on need for urine to send for testing. Pt states she knows when she has to pee but has been using the diaper because of the proximity to the bathroom. Pt informed that she should call staff to assist with collection.

## 2025-04-14 NOTE — ED PROVIDER NOTE - PROGRESS NOTE DETAILS
Attending Harshal Raza:  Patient signed out to me. here for concern for delirium, with passive SI, pending labs, psych consult    I have fully participated in the care of this patient from the time of signout. I have made amendments to the documentation that were entered from the time of signout where appropriate and have supervised the ongoing plan of care enacted by the Fellow/Resident/ACP/Student. Srinivas Herbert MD, PGY1: Pt received at signout. Pt hemodynamically stable. Pt's son  mark nova  called confirming story from [facility]. States pt was intubated in January 2025 for PNA, dc to [facility]. Can't go home since home is unsafe 2/2 to black mold and not being cleaned for 10yrs. Pt has called police multiple times in the past week while at rehab due to not being allowed to see her  who is also a pt there.   son - [phone] - mark nova  - ras   older daughter- [phone] - eda Srinivas Herbert MD, PGY1: Pt cleared by tele psych, no concern for psychiatric condition. Pt hemodynamically stable. Attending Harshal Raza:  Discussed with telepsych. Cleared. No safety concerns. 1:1 not needed. nonemergent.

## 2025-04-14 NOTE — ED PROVIDER NOTE - EKG/XRAY ADDITIONAL INFORMATION
Attending Harshal Raza:  I, Dr. Harshal Raza, independently interpreted the : ekg  interp at 1154p  sinus tachycardia, left axis, rate 101 pr 138 qrs 74 qtc 427

## 2025-04-14 NOTE — ED PROVIDER NOTE - PATIENT PORTAL LINK FT
You can access the FollowMyHealth Patient Portal offered by [facility] by registering at the following website: [email or web address]. By joining [facility]’s FollowMyHealth portal, you will also be able to view your health information using other applications (apps) compatible with our system.
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
## 2025-04-15 VITALS
HEART RATE: 95 BPM | OXYGEN SATURATION: 97 % | TEMPERATURE: 98 F | RESPIRATION RATE: 18 BRPM | SYSTOLIC BLOOD PRESSURE: 146 MMHG | DIASTOLIC BLOOD PRESSURE: 78 MMHG

## 2025-04-15 DIAGNOSIS — F43.29 ADJUSTMENT DISORDER WITH OTHER SYMPTOMS: ICD-10-CM

## 2025-04-15 LAB
CULTURE RESULTS: SIGNIFICANT CHANGE UP
ETHANOL SERPL-MCNC: <10 MG/DL — SIGNIFICANT CHANGE UP (ref 0–10)
SPECIMEN SOURCE: SIGNIFICANT CHANGE UP

## 2025-04-15 PROCEDURE — 99285 EMERGENCY DEPT VISIT HI MDM: CPT | Mod: 25

## 2025-04-15 PROCEDURE — 80053 COMPREHEN METABOLIC PANEL: CPT

## 2025-04-15 PROCEDURE — 80307 DRUG TEST PRSMV CHEM ANLYZR: CPT

## 2025-04-15 PROCEDURE — 93005 ELECTROCARDIOGRAM TRACING: CPT

## 2025-04-15 PROCEDURE — 85025 COMPLETE CBC W/AUTO DIFF WBC: CPT

## 2025-04-15 PROCEDURE — 87086 URINE CULTURE/COLONY COUNT: CPT

## 2025-04-15 PROCEDURE — 81001 URINALYSIS AUTO W/SCOPE: CPT

## 2025-04-15 PROCEDURE — 70450 CT HEAD/BRAIN W/O DYE: CPT | Mod: MC

## 2025-04-15 PROCEDURE — 90792 PSYCH DIAG EVAL W/MED SRVCS: CPT | Mod: 2W

## 2025-04-15 PROCEDURE — 80048 BASIC METABOLIC PNL TOTAL CA: CPT

## 2025-04-15 NOTE — ED ADULT NURSE REASSESSMENT NOTE - NS ED NURSE REASSESS COMMENT FT1
Report received from NIRMAL Carreon. pt resting in stretcher, a&ox3, breathing spontaneous and unlabored, clark and following commands. Pt denies pain or symptoms at this time. pending nonemerg
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
## 2025-04-15 NOTE — ED BEHAVIORAL HEALTH ASSESSMENT NOTE - RISK ASSESSMENT
Elevated risk of harm given age and resistance towards treatment; protective factors incl no prior SA/SIB/violence, denies active SI, risk mitigated in supervised setting of nursing facility/limited access to means of harm
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
## 2025-04-15 NOTE — ED ADULT NURSE REASSESSMENT NOTE - NS ED NURSE REASSESS COMMENT FT1
Pt placed on 1:1 for risk of elopement. Pt getting out of stretcher, trying to leave and states she is leaving and her friend is picking her up. Pt placed on 1:1 for risk of elopement, pt agitated and restless in stretcher. Pt getting out of stretcher, trying to leave and states she is leaving and her friend is picking her up. Pt placed on enhanced supervision close to nursing station in [facility] 6 for risk of elopement, pt agitated and restless in stretcher. Pt getting out of stretcher, trying to leave and states she is leaving and her friend is picking her up.
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
## 2025-04-15 NOTE — ED BEHAVIORAL HEALTH ASSESSMENT NOTE - SOURCE OF INFORMATION
when cooking. · Don't skip meals. Your blood sugar may drop too low if you skip meals and take insulin or certain medicines for diabetes. · Check with your doctor before you drink alcohol. Alcohol can cause your blood sugar to drop too low. Alcohol can also cause a bad reaction if you take certain diabetes medicines. Follow-up care is a key part of your treatment and safety. Be sure to make and go to all appointments, and call your doctor if you are having problems. It's also a good idea to know your test results and keep a list of the medicines you take. Where can you learn more? Go to [email or web address] org and sign in to your [employer] account. Enter N169 in the [employer] box to learn more about \"Learning About Diabetes Food Guidelines. \"     If you do not have an account, please click on the \"Sign Up Now\" link. Current as of: March 13, 2017  Content Version: 11.5  © 8167-3543 Healthwise, Incorporated. Care instructions adapted under license by [facility] ([facility]). If you have questions about a medical condition or this instruction, always ask your healthcare professional. [place] [address] any warranty or liability for your use of this information.
Patient
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
## 2025-04-15 NOTE — ED BEHAVIORAL HEALTH ASSESSMENT NOTE - DESCRIPTION
See HPI, states that she previously worked as a  in the [employer] but stopped working after her marriage,  worked as an  for [employer], has son who lives with wife nearby, daughter lives in [place]. Pt is [employer]. CHF, asthma, HTN, GERD Pt in good behavioral control in ED. UA unrevealing, CTH with no acute findings but noted atherosclerotic changes    Vital Signs Last 24 Hrs  T(C): 37.2 (15 Apr 2025 03:40), Max: 37.2 (15 Apr 2025 03:40)  T(F): 98.9 (15 Apr 2025 03:40), Max: 98.9 (15 Apr 2025 03:40)  HR: 82 (15 Apr 2025 03:40) (82 - 108)  BP: 140/82 (15 Apr 2025 03:40) (140/82 - 160/85)  BP(mean): --  RR: 20 (15 Apr 2025 03:40) (20 - 20)  SpO2: 98% (15 Apr 2025 03:40) (97% - 98%)    Parameters below as of 14 Apr 2025 23:30  Patient On (Oxygen Delivery Method): room air

## 2025-04-15 NOTE — ED BEHAVIORAL HEALTH ASSESSMENT NOTE - CURRENT MEDICATION
Denies any psychiatric medications, other medications dispensed by facility, pt does not recall (e.g. famotidine, antibiotics, asthma medications)

## 2025-04-15 NOTE — ED ADULT NURSE REASSESSMENT NOTE - NS ED NURSE REASSESS COMMENT FT1
Pt observed sitting up in stretcher conversing with RN without difficulty. Breathing spontaneous and unlabored. Pt updated on plan of care awaiting tele psych. No acute distress noted. Call bell within reach. vss Pt walking in the ED with a walker, denies sob. Pt observed sitting up in stretcher conversing with RN without difficulty. Breathing spontaneous and unlabored. Pt updated on plan of care awaiting tele psych. No acute distress noted. Call bell within reach. vss Pt walking in the ED with a walker, denies sob. Pt educated on need for urine to send for testing. Pt states she knows when she has to pee but has been using the diaper because of the proximity to the bathroom. Pt informed that she should call staff to assist with collection.

## 2025-04-15 NOTE — ED BEHAVIORAL HEALTH ASSESSMENT NOTE - DIFFERENTIAL
Adjustment disorder with mixed emotional features; major neurocognitive disorder with behavioral disturbance